# Patient Record
Sex: MALE | Race: BLACK OR AFRICAN AMERICAN | Employment: UNEMPLOYED | URBAN - METROPOLITAN AREA
[De-identification: names, ages, dates, MRNs, and addresses within clinical notes are randomized per-mention and may not be internally consistent; named-entity substitution may affect disease eponyms.]

---

## 2018-11-17 ENCOUNTER — HOSPITAL ENCOUNTER (EMERGENCY)
Age: 33
Discharge: HOME | End: 2018-11-17
Attending: EMERGENCY MEDICINE
Payer: SELF-PAY

## 2018-11-17 ENCOUNTER — APPOINTMENT (OUTPATIENT)
Dept: GENERAL RADIOLOGY | Age: 33
End: 2018-11-17
Attending: EMERGENCY MEDICINE
Payer: SELF-PAY

## 2018-11-17 VITALS
DIASTOLIC BLOOD PRESSURE: 87 MMHG | SYSTOLIC BLOOD PRESSURE: 131 MMHG | WEIGHT: 235 LBS | RESPIRATION RATE: 16 BRPM | HEART RATE: 87 BPM | BODY MASS INDEX: 31.14 KG/M2 | TEMPERATURE: 98.1 F | OXYGEN SATURATION: 95 % | HEIGHT: 73 IN

## 2018-11-17 DIAGNOSIS — S61.421A LACERATION OF RIGHT HAND WITH FOREIGN BODY, INITIAL ENCOUNTER: Primary | ICD-10-CM

## 2018-11-17 PROCEDURE — 73130 X-RAY EXAM OF HAND: CPT

## 2018-11-17 PROCEDURE — 90471 IMMUNIZATION ADMIN: CPT

## 2018-11-17 PROCEDURE — 74011250636 HC RX REV CODE- 250/636: Performed by: EMERGENCY MEDICINE

## 2018-11-17 PROCEDURE — 90715 TDAP VACCINE 7 YRS/> IM: CPT | Performed by: EMERGENCY MEDICINE

## 2018-11-17 PROCEDURE — 74011000250 HC RX REV CODE- 250: Performed by: EMERGENCY MEDICINE

## 2018-11-17 PROCEDURE — 99282 EMERGENCY DEPT VISIT SF MDM: CPT

## 2018-11-17 PROCEDURE — 77030031132 HC SUT NYL COVD -A

## 2018-11-17 PROCEDURE — 75810000293 HC SIMP/SUPERF WND  RPR

## 2018-11-17 PROCEDURE — 77030018836 HC SOL IRR NACL ICUM -A

## 2018-11-17 RX ORDER — CEPHALEXIN 500 MG/1
500 CAPSULE ORAL 3 TIMES DAILY
Qty: 9 CAP | Refills: 0 | Status: SHIPPED | OUTPATIENT
Start: 2018-11-17 | End: 2018-11-20

## 2018-11-17 RX ORDER — BACITRACIN 500 UNIT/G
1 PACKET (EA) TOPICAL ONCE
Status: COMPLETED | OUTPATIENT
Start: 2018-11-17 | End: 2018-11-17

## 2018-11-17 RX ORDER — BACITRACIN 500 [USP'U]/G
OINTMENT TOPICAL 3 TIMES DAILY
Qty: 1 TUBE | Refills: 0 | Status: SHIPPED | OUTPATIENT
Start: 2018-11-17 | End: 2018-11-27

## 2018-11-17 RX ADMIN — BACITRACIN 1 PACKET: 500 OINTMENT TOPICAL at 06:07

## 2018-11-17 RX ADMIN — TETANUS TOXOID, REDUCED DIPHTHERIA TOXOID AND ACELLULAR PERTUSSIS VACCINE, ADSORBED 0.5 ML: 5; 2.5; 8; 8; 2.5 SUSPENSION INTRAMUSCULAR at 04:21

## 2018-11-17 NOTE — ED PROVIDER NOTES
EMERGENCY DEPARTMENT HISTORY AND PHYSICAL EXAM 
 
6:13 AM 
 
 
Date: 11/17/2018 Patient Name: Mallory Lind History of Presenting Illness Chief Complaint Patient presents with  Laceration Patient is right-hand dominant male punched a glass window about 2 hours prior to arrival.  Sustained a laceration to the palm of his hand. Has full range of motion but reports decreased sensation which is not new. Reports that he had hurt his hand in the past and he knows his hand is there  but has had decreased sensation after that injury in the past.  There is no new numbness by his report. No other injuries sustained, unknown last tetanus shot but he did receive his vaccinations in the past 
 
PCP: None Current Outpatient Medications Medication Sig Dispense Refill  cephALEXin (KEFLEX) 500 mg capsule Take 1 Cap by mouth three (3) times daily for 3 days. 9 Cap 0  
 bacitracin (BACITRACIN) 500 unit/gram oint Apply  to affected area three (3) times daily for 10 days. Apply to affected area 1 Tube 0 Past History Past Medical History: 
History reviewed. No pertinent past medical history. Past Surgical History: 
History reviewed. No pertinent surgical history. Family History: 
History reviewed. No pertinent family history. Social History: 
Social History Tobacco Use  Smoking status: Never Smoker Substance Use Topics  Alcohol use: Yes  Drug use: Not on file Allergies: 
No Known Allergies Review of Systems Review of Systems Constitutional: Negative for fever. Skin: Positive for wound. Visit Vitals /87 (BP Patient Position: At rest;Sitting) Pulse 87 Temp 98.1 °F (36.7 °C) Resp 16 Ht 6' 1\" (1.854 m) Wt 106.6 kg (235 lb) SpO2 95% BMI 31.00 kg/m² Physical Exam / Medical Decision Making I am the first provider for this patient.  
 
I reviewed the vital signs, available nursing notes, past medical history, past surgical history, family history and social history. Vital Signs-Reviewed the patient's vital signs. Physical exam: 
General:  Well-developed, well-nourished, no apparent distress Head:  Normocephalic atraumatic Eyes:  Pupils midrange extraocular movements grossly intact. Nose:  No rhinorrhea, inspection grossly normal   
Ears:  Grossly normal to inspection Mouth:  Mucous membranes moist   
Neck/Back:  Trachea midline, no asymmetry Chest:  Grossly normal inspection, symmetric chest rise, respirations nonlabored Extremities:  Grossly normal to inspection  RIGHT hand:  3 cm laceration at the ulnar aspect of the palm proximally 1-2 cm distal to the crease of the wrist.  There is no active bleeding there is intact flexion and extension of all the fingers, sensation is decreased to light touch in an ulnar distribution. Neurologic:  Alert and oriented no appreciable focal neurologic deficit Psychiatric:  Grossly normal mood and affect Nursing note reviewed, vital signs reviewed. ED course: 
Patient presents with laceration to the palm, this was broken glass will plan for an x-ray update tetanus and primary repair here X-ray RIGHT hand per my interpretation no fracture but there are findings concerning for small flecks of glass in the wound, Patient's wound was anesthetized, was able to remove 2 discrete pieces of glass, was explored to the bottom of bloodless field, no palpable foreign body remains, this wound was vigorously irrigated by myself with saline Procedure note: 
Laceration repair Performed by: Myself Preparation: Irrigation and standard sterile precautions Location: RIGHT palm Wound length: 3 cm Anesthesia:  Local injection of lidocaine Foreign bodies: None Irrigation via saline jet lavage Debridement: None Skin closure:  4-0 nylon simple interrupted Number of sutures: 3 
M.D. procedure time:  10 minutes Patient's presentation, history, physical exam and laboratory evaluations were reviewed. At this time patient was felt to be stable for outpatient management and follow with primary care/specialist.  Patient was instructed to return to the emergency department with any concerns. Disposition: 
 
Discharged home Portions of this chart were created with Dragon medical speech to text program.   Unrecognized errors may be present. Diagnostic Study Results Labs - No results found for this or any previous visit (from the past 12 hour(s)). Radiologic Studies -  
XR HAND RT MIN 3 V    (Results Pending) Diagnosis Clinical Impression: 1. Laceration of right hand with foreign body, initial encounter Follow-up Information Follow up With Specialties Details Why Contact Info 98 [facility] Call in 1 week For suture removal  x [address] [place] [id number] [place] [address] ([facility]) [phone] [facility] EMERGENCY DEPT Emergency Medicine  As needed, If symptoms worsen [address] 
[phone] Medication List  
  
START taking these medications   
bacitracin 500 unit/gram Oint Commonly known as:  BACITRACIN Apply  to affected area three (3) times daily for 10 days. Apply to affected area 
  
cephALEXin 500 mg capsule Commonly known as:  Jj Hodge Take 1 Cap by mouth three (3) times daily for 3 days. Where to Get Your Medications Information about where to get these medications is not yet available Ask your nurse or doctor about these medications · bacitracin 500 unit/gram Oint · cephALEXin 500 mg capsule 
  
 
_______________________________ Attestations: 
Scribe Attestation Jocy Flores MD acting as a scribe for and in the presence of Kaylie Shah MD     
November 17, 2018 at 6:14 AM 
    
Provider Attestation:     
I personally performed the services described in the documentation, reviewed the documentation, as recorded by the scribe in my presence, and it accurately and completely records my words and actions. November 17, 2018 at 1041 [facility] Carlson MD   
_______________________________

## 2018-11-17 NOTE — LETTER
[address] EMERGENCY DEPT 
[address] [place] [phone] 
[phone] Work/School Note Date: 11/17/2018 To Whom It May concern: 
 
Aurora Mixon was seen and treated today in the emergency room by the following provider(s): 
Attending Provider: Luana Schwartz MD. Accompanied by his brother Linda Grandchild  may return to work on 18 Nov 2018. Sincerely, Shanique Tsai RN

## 2018-11-17 NOTE — DISCHARGE INSTRUCTIONS
Cuts: Care Instructions  Your Care Instructions  A cut can happen anywhere on your body. Stitches, staples, skin adhesives, or pieces of tape called Steri-Strips are sometimes used to keep the edges of a cut together and help it heal. Steri-Strips can be used by themselves or with stitches or staples. Sometimes cuts are left open. If the cut went deep and through the skin, the doctor may have closed the cut in two layers. A deeper layer of stitches brings the deep part of the cut together. These stitches will dissolve and don't need to be removed. The upper layer closure, which could be stitches, staples, Steri-Strips, or adhesive, is what you see on the cut. A cut is often covered by a bandage. The doctor has checked you carefully, but problems can develop later. If you notice any problems or new symptoms, get medical treatment right away. Follow-up care is a key part of your treatment and safety. Be sure to make and go to all appointments, and call your doctor if you are having problems. It's also a good idea to know your test results and keep a list of the medicines you take. How can you care for yourself at home? If a cut is open or closed  · Prop up the sore area on a pillow anytime you sit or lie down during the next 3 days. Try to keep it above the level of your heart. This will help reduce swelling. · Keep the cut dry for the first 24 to 48 hours. After this, you can shower if your doctor okays it. Pat the cut dry. · Don't soak the cut, such as in a bathtub. Your doctor will tell you when it's safe to get the cut wet. · After the first 24 to 48 hours, clean the cut with soap and water 2 times a day unless your doctor gives you different instructions. ? Don't use hydrogen peroxide or alcohol, which can slow healing. ? You may cover the cut with a thin layer of petroleum jelly and a nonstick bandage.   ? If the doctor put a bandage over the cut, put on a new bandage after cleaning the cut or if the bandage gets wet or dirty. · Avoid any activity that could cause your cut to reopen. · Be safe with medicines. Read and follow all instructions on the label. ? If the doctor gave you a prescription medicine for pain, take it as prescribed. ? If you are not taking a prescription pain medicine, ask your doctor if you can take an over-the-counter medicine. If the cut is closed with stitches, staples, or Steri-Strips  · Follow the above instructions for open or closed cuts. · Do not remove the stitches or staples on your own. Your doctor will tell you when to come back to have the stitches or staples removed. · Leave Steri-Strips on until they fall off. If the cut is closed with a skin adhesive  · Follow the above instructions for open or closed cuts. · Leave the skin adhesive on your skin until it falls off on its own. This may take 5 to 10 days. · Do not scratch, rub, or pick at the adhesive. · Do not put the sticky part of a bandage directly on the adhesive. · Do not put any kind of ointment, cream, or lotion over the area. This can make the adhesive fall off too soon. Do not use hydrogen peroxide or alcohol, which can slow healing. When should you call for help? Call your doctor now or seek immediate medical care if:    · You have new pain, or your pain gets worse.     · The skin near the cut is cold or pale or changes color.     · You have tingling, weakness, or numbness near the cut.     · The cut starts to bleed, and blood soaks through the bandage. Oozing small amounts of blood is normal.     · You have trouble moving the area near the cut.     · You have symptoms of infection, such as:  ? Increased pain, swelling, warmth, or redness around the cut.  ? Red streaks leading from the cut.  ? Pus draining from the cut.  ? A fever.    Watch closely for changes in your health, and be sure to contact your doctor if:    · The cut reopens.     · You do not get better as expected.    Where can you learn more? Go to http://mahogany-kenneth.info/. Enter M735 in the search box to learn more about \"Cuts: Care Instructions. \"  Current as of: November 20, 2017  Content Version: 11.8  © 3220-3793 [employer]. Care instructions adapted under license by [employer] (which disclaims liability or warranty for this information). If you have questions about a medical condition or this instruction, always ask your healthcare professional. [address] any warranty or liability for your use of this information.